# Patient Record
Sex: FEMALE | Race: BLACK OR AFRICAN AMERICAN | Employment: UNEMPLOYED | URBAN - METROPOLITAN AREA
[De-identification: names, ages, dates, MRNs, and addresses within clinical notes are randomized per-mention and may not be internally consistent; named-entity substitution may affect disease eponyms.]

---

## 2017-01-09 ENCOUNTER — OFFICE VISIT (OUTPATIENT)
Dept: FAMILY MEDICINE CLINIC | Age: 57
End: 2017-01-09

## 2017-01-09 VITALS
BODY MASS INDEX: 30.92 KG/M2 | TEMPERATURE: 98.2 F | SYSTOLIC BLOOD PRESSURE: 115 MMHG | HEIGHT: 65 IN | WEIGHT: 185.6 LBS | HEART RATE: 80 BPM | RESPIRATION RATE: 16 BRPM | OXYGEN SATURATION: 100 % | DIASTOLIC BLOOD PRESSURE: 76 MMHG

## 2017-01-09 DIAGNOSIS — J45.30 MILD PERSISTENT ASTHMA WITHOUT COMPLICATION: Primary | ICD-10-CM

## 2017-01-09 DIAGNOSIS — J11.1 INFLUENZA: ICD-10-CM

## 2017-01-09 DIAGNOSIS — Z90.710 S/P TAH (TOTAL ABDOMINAL HYSTERECTOMY): ICD-10-CM

## 2017-01-09 DIAGNOSIS — Z11.59 NEED FOR HEPATITIS C SCREENING TEST: ICD-10-CM

## 2017-01-09 DIAGNOSIS — I50.30 DIASTOLIC HEART FAILURE, NYHA CLASS 1 (HCC): ICD-10-CM

## 2017-01-09 DIAGNOSIS — I10 ESSENTIAL HYPERTENSION WITH GOAL BLOOD PRESSURE LESS THAN 140/90: ICD-10-CM

## 2017-01-09 DIAGNOSIS — J01.90 ACUTE BACTERIAL SINUSITIS: ICD-10-CM

## 2017-01-09 DIAGNOSIS — M54.2 NECK PAIN: ICD-10-CM

## 2017-01-09 DIAGNOSIS — B96.89 ACUTE BACTERIAL SINUSITIS: ICD-10-CM

## 2017-01-09 RX ORDER — TRETINOIN 0.25 MG/G
CREAM TOPICAL
COMMUNITY
Start: 2016-11-25

## 2017-01-09 RX ORDER — CLINDAMYCIN HYDROCHLORIDE 150 MG/1
CAPSULE ORAL
Refills: 0 | COMMUNITY
Start: 2016-11-27 | End: 2017-01-09 | Stop reason: ALTCHOICE

## 2017-01-09 RX ORDER — HYDROCODONE BITARTRATE AND ACETAMINOPHEN 5; 325 MG/1; MG/1
TABLET ORAL
Refills: 0 | COMMUNITY
Start: 2016-11-27 | End: 2022-06-07

## 2017-01-09 RX ORDER — DOXYCYCLINE 100 MG/1
CAPSULE ORAL
Refills: 2 | COMMUNITY
Start: 2017-01-03

## 2017-01-09 RX ORDER — MONTELUKAST SODIUM 10 MG/1
10 TABLET ORAL DAILY
Qty: 30 TAB | Refills: 1 | Status: SHIPPED | COMMUNITY
Start: 2017-01-09 | End: 2017-01-09 | Stop reason: SDUPTHER

## 2017-01-09 RX ORDER — ALPRAZOLAM 0.25 MG/1
0.25 TABLET ORAL
Qty: 60 TAB | Refills: 0 | Status: CANCELLED | OUTPATIENT
Start: 2017-01-09

## 2017-01-09 NOTE — PROGRESS NOTES
HISTORY OF PRESENT ILLNESS  Felicia Chu is a 64 y.o. female presents with Referral Follow Up; ED Follow-up; and Asthma    Agree with nurse note. Hypertensive pt presents to the office with a hx of Diastolic heart failure has a BP of 115/76. She saw NP Inge De Los Santos, associated with cardiologist, Dr. Jayson Anderson, on 9/9/16. HTN well controlled on Lisinopril 2.5 mg daily. Take Lasix 20 mg daily prn for edema. Pt's most recent eye exam was at [facility]. Pt takes Xanax 9.80 mg prn for certain situations like flying. She has used maybe 3 tablets out of 60 tablets, filled on 12/8/16 and rx'd by me. Pt reports going to [facility] prior to [facility] for R sided neck pain and spasm due to cough. Dx'd flu and sinusitis. Tx'd with antibiotic and Tamiflu. After 3 days of coughing, she began to experience chest tightness and continues with that today. She feels short-winded with talking. She is using Albuterol HFA 90 mcg more than twice weekly. She has not had an asthma flare-up since she was a child. Pt is agreeable with Hep C screening. Health Maintenance    Pt's most recent colonoscopy was on 1/9/12 with GI, Dr. Bronwyn Gottron. Pt is s/p ABRAHAM since 1999 due to vaginal bleeding and performed by Dr. Zackary Zambrano. Her insurance will cover a pap smear. Written by deandra Jimenez, as dictated by Dr. Kofi Rodriguez DO.    ROS    Review of Systems negative except as noted above in HPI.     ALLERGIES:    Allergies   Allergen Reactions    Effexor [Venlafaxine] Other (comments)     Did not feel right       CURRENT MEDICATIONS:    Outpatient Prescriptions Marked as Taking for the 1/9/17 encounter (Office Visit) with Roz Carlton DO   Medication Sig Dispense Refill    doxycycline (VIBRAMYCIN) 100 mg capsule TAKE ONE CAPSULE BY MOUTH EVERY DAY  2    HYDROcodone-acetaminophen (NORCO) 5-325 mg per tablet TAKE 1 TABLET EVERY 6 HOURS AS NEEDED FOR PAIN  0    tretinoin (RETIN-A) 0.025 % topical cream       montelukast (SINGULAIR) 10 mg tablet Take 1 Tab by mouth daily. Indications: ALLERGIC RHINITIS, MAINTENANCE THERAPY FOR ASTHMA 30 Tab 1    budesonide (PULMICORT) 180 mcg/actuation aepb inhaler Take 2 Puffs by inhalation two (2) times a day. Indications: MAINTENANCE THERAPY FOR ASTHMA 1 Each 5    ALPRAZolam (XANAX) 0.25 mg tablet Take 1 Tab by mouth two (2) times daily as needed for Anxiety. Max Daily Amount: 0.5 mg. 60 Tab 0    clindamycin (CLEOCIN T) 1 % lotion       albuterol (PROVENTIL HFA, VENTOLIN HFA, PROAIR HFA) 90 mcg/actuation inhaler Take 2 Puffs by inhalation every six (6) hours as needed for Wheezing. 1 Inhaler 3    fluticasone (FLONASE) 50 mcg/actuation nasal spray 2 Sprays by Both Nostrils route daily. 1 Bottle 5    furosemide (LASIX) 20 mg tablet Take once a day for swelling if needed. 90 Tab 3    ibuprofen (MOTRIN) 800 mg tablet Take 1 Tab by mouth every eight (8) hours as needed for Pain. 30 Tab 0    lisinopril (PRINIVIL, ZESTRIL) 2.5 mg tablet Take 1 Tab by mouth daily. 90 Tab 3    cyclobenzaprine (FLEXERIL) 10 mg tablet Take 1 Tab by mouth three (3) times daily as needed for Muscle Spasm(s). 30 Tab 0    cholecalciferol, vitamin D3, 4,000 unit tab Take  by mouth.  MULTIVITAMIN PO Take  by mouth. PAST MEDICAL HISTORY:    Past Medical History   Diagnosis Date    Acne vulgaris 03/2007     Dr. Hoda Hoyos Asthma childhood    Chalazion 10/17/00     Dr. Jorge Goel Chickenpox childhood    DDD (degenerative disc disease)      lumbar, cervical.  Dr. Iliana Núñez, cy[place]ney    Diastolic heart failure, NYHA class 1 ([facility] [address] 5/2015     EF 83-66%, grade 1 diastolic dysfunction, Mild LVH. Dr. Steffanie Ocasio 07/2015    Fever 07/2001     with arthralgia, rash. Dr. Raegan Gamboa pain, right 04/2012     Dr. Fabián Hester.  Ganglion of wrist 02/18/04     Left.   Dr. Kassie Clarke. pylori infection 1/3/12    Left ACL tear 2009, 2011     Left. due to ACL, MCL tear. Dr. Lam Ervin Left knee pain 10/05/09     Dr. Rosy Harman Left knee pain 2014     with tibial cyst.  Dr. Wilmar Siddiqui.  Leg swelling 05/2015     Dr. Marysol Banerjee.  Lipoma      Left shoulder.  LVH (left ventricular hypertrophy) 5/2015     Dr. Jerson Davis.  Screening for cervical cancer      Dr. Marco Antonio Salazar, Last Pap 5/8/15    Sigmoid diverticulosis 01/2012     Mild. Dr. Salina Orr.  Snoring 07/16/15     Dr. Erica Sprague. No ISAURO.  Sprain of finger, right 8/6/2013     Seen at [facility] in [facility] menopause        PAST SURGICAL HISTORY:    Past Surgical History   Procedure Laterality Date    Pr exc skin benig 0.6-1cm trunk,arm,leg Left 07/07/97     Lipoma. Left shoulder. Dr. Anna Barnes Hx total abdominal hysterectomy  1999     Dr. Brenton Marti. due to vaginal bleeding    Hx tubal ligation  1996    Hx cyst removal Left 02/2004, 2009     Left wrist.  Dr. Wen Pascual x2 then Dr. Wilmar Siddiqui.  Hx lipoma resection  07/1999     Dr. Anna Barnes Hx tonsillectomy  1979    Hx acl reconstruction Left 2009, 01/1011     due to Meniscal tear x 2 and ACL, MCL tear. Dr. Lam Ervin Hx open reduction internal fixation Right 4/27/2012     PINS PLACED IN SECOND METATARSAL FRACTURED TOES. Dr. Ivone Brice.  Hx orthopaedic Right 09/04/12     REMOVAL OF HARDWARE FROM SECOND METATARSAL. Dr. Lam Ervin Hx knee arthroscopy Left 12/2014     with debridement of tibial cyst.  Dr. Rosy Harman Hx colonoscopy  01/09/12     Dr. Salina Orr.  Hx endoscopy  01/09/12     due to abd pain. Dr. Meeta Iqbal.        FAMILY HISTORY:    Family History   Problem Relation Age of Onset    Hypertension Mother     No Known Problems Father     Stroke Paternal Grandfather        SOCIAL HISTORY:    Social History     Social History    Marital status:      Spouse name: N/A    Number of children: N/A    Years of education: N/A     Occupational History          Social History Main Topics    Smoking status: Former Smoker     Packs/day: 0.25     Years: 15.00     Types: Cigarettes     Quit date: 7/1/1999    Smokeless tobacco: Never Used      Comment: former cigarette smoker    Alcohol use 0.0 oz/week     0 Standard drinks or equivalent per week      Comment: Occ vodka/2 glasses wine per month    Drug use: No    Sexual activity: Yes     Partners: Male     Birth control/ protection: Surgical      Comment: ABRAHAM, NITHYA     Other Topics Concern    None     Social History Narrative       IMMUNIZATIONS:    Immunization History   Administered Date(s) Administered    Influenza Vaccine (Quad) PF 11/25/2016    TD Vaccine 07/01/2001         PHYSICAL EXAMINATION    Vital Signs    Visit Vitals    /76 (BP 1 Location: Left arm, BP Patient Position: Sitting)    Pulse 80    Temp 98.2 °F (36.8 °C) (Oral)    Resp 16    Ht 5' 4.57\" (1.64 m)    Wt 185 lb 9.6 oz (84.2 kg)    SpO2 100%    BMI 31.3 kg/m2       Weight Metrics 1/9/2017 11/25/2016 9/9/2016 3/29/2016 12/28/2015 12/14/2015 7/23/2015   Weight 185 lb 9.6 oz 180 lb 6.4 oz 184 lb 184 lb 14.4 oz 178 lb 181 lb 3.2 oz 182 lb   BMI 31.3 kg/m2 30.42 kg/m2 29.7 kg/m2 29.86 kg/m2 28.74 kg/m2 30.15 kg/m2 30.29 kg/m2       General appearance - Well nourished. Well appearing. Well developed. No acute distress. Overweight. Head - Normocephalic. Atraumatic. Eyes - pupils equal and reactive. Extraocular eye movements intact. Sclera anicteric. Mildly injected sclera. Ears - Hearing is grossly normal bilaterally. Nose - normal and patent. No polyps noted. No erythema. No discharge. Mouth - mucous membranes with adequate moisture. Posterior pharynx normal with cobblestone appearance. No erythema, white exudate or obstruction. Neck - supple. Midline trachea. No carotid bruits noted bilaterally. No thyromegaly noted.   Chest - clear to auscultation bilaterally anteriorly and posteriorly. No wheezes. No rales or rhonchi. Breath sounds are symmetrical bilaterally. Unlabored respirations. Occasional dry cough. Heart - normal rate. Regular rhythm. Normal S1, S2. No murmur noted. No rubs, clicks or gallops noted. Abdomen - soft and distended. No masses or organomegaly. No rebound, rigidity or guarding. Bowel sounds normal x 4 quadrants. No tenderness noted. Neurological - awake, alert and oriented to person, place, and time and event. Cranial nerves II through XII intact. Clear speech. Muscle strength is +5/5 x 4 extremities. Sensation is intact to light touch bilaterally. Steady gait. Heme/Lymph - peripheral pulses normal x 4 extremities. No peripheral edema is noted. Musculoskeletal - Intact x 4 extremities. Full ROM x 4 extremities. No pain with movement. Back exam - normal range of motion. No pain on palpation of the spinous processes in the cervical, thoracic, lumbar, sacral regions. No CVA tenderness. Skin - no rashes, erythema, ecchymosis, lacerations, abrasions, suspicious moles noted  Psychological -   normal behavior, dress and thought processes. Good insight. Good eye contact. Normal affect. Appropriate mood. Normal speech. ASSESSMENT and PLAN      ICD-10-CM ICD-9-CM    1. Mild persistent asthma without complication K75.86 411.09 budesonide (PULMICORT) 180 mcg/actuation aepb inhaler      DISCONTINUED: montelukast (SINGULAIR) 10 mg tablet   2. Essential hypertension with goal blood pressure less than 140/90 I10 401.9    3. Need for hepatitis C screening test Z11.59 V73.89 HEPATITIS C AB   4. Acute bacterial sinusitis J01.90 461.9 doxycycline (VIBRAMYCIN) 100 mg capsule    B96.89      resolved after antibiotic   5. S/P ABRAHAM (total abdominal hysterectomy) Z90.710 V88.01    6. Neck pain M54.2 723.1 HYDROcodone-acetaminophen (NORCO) 5-325 mg per tablet    R due to spasm from cough   7. Influenza J11.1 487.1    8. Diastolic heart failure, NYHA class 1 ([facility]) I50.30 428.30     stable       Discussed the patient's BMI with her. The BMI follow up plan is as follows: BMI is out of normal parameters and plan is as follows: I have counseled this patient on diet and exercise regimens. Chart reviewed and updated. [facility] reviewed. Xanax 0.25 mg last filled on 12/8/16 for 60 tablets. New Controlled Substance Agreement reviewed and signed. Advise pt to bring in pill bottle(s) for pill counts. Pt declines nebulizer tx today due to rushing to work on [employer]. Continue current medications and care. Start Singulair 10 mg daily and Pulmicort inhaler 2 puffs BID, rinse mouth after use. Prescriptions written and sent to pharmacy; medication side effects discussed. Singulair 10 mg. Pulmicort 180 mcg/actuation aepb inhaler. Most recent tests reviewed. Recheck pertinent labs today. Recent office visit notes from [address] reviewed. Get recent office visit notes from [facility] and [facility]. Advised pt to sign release. Referrals given; patient urged to keep appointments with specialists. Counseled patient on health concerns:  BP, chest tightness, and asthma plan. Relevant handouts given and discussed with patient. Immunizations noted. Offered empathy, support, legitimation, prayers, partnership to patient. Praised patient for progress. Pt will bring in her [address] Follow-up Disposition:  Return in about 1 month (around 2/9/2017) for asthma. Patient was offered a choice/choices in the treatment plan today. Patient expresses understanding of the plan and agrees with recommendations. Written by deandra Reyna, as dictated by Dr. Carolyn Smith DO. Documentation True and Accepted by Katalina Perea. Davide Silveira.       Patient Instructions       Asthma Action Plan: After Your Visit  Your Care Instructions  An asthma action plan is based on peak flow and asthma symptoms. Sorting symptoms and peak flow into red, yellow, and green \"zones\" can help you know how bad your asthma is and what actions you should take. Work with your doctor to make your plan. An action plan may include:  · The peak flow readings and symptoms for each zone. · What medicines to take in each zone. · When to call a doctor. · A list of emergency contact numbers. · A list of your asthma triggers. Follow-up care is a key part of your treatment and safety. Be sure to make and go to all appointments, and call your doctor if you are having problems. It's also a good idea to know your test results and keep a list of the medicines you take. How can you care for yourself at home? · Take your daily medicines to help minimize long-term damage and avoid asthma attacks. · Check your peak flow every morning and evening. This is the best way to know how well your lungs are working. · Check your action plan to see what zone you are in.  ¨ If you are in the green zone, keep taking your daily asthma medicines as prescribed. ¨ If you are in the yellow zone, you may be having or will soon have an asthma attack. You may not have any symptoms, but your lungs are not working as well as they should. Take the medicines listed in your action plan. If you stay in the yellow zone, your doctor may need to increase the dose or add a medicine. ¨ If you are in the red zone, follow your action plan. If your symptoms or peak flow don't improve soon, you may need to go to the emergency room or be admitted to the hospital.  · Use an asthma diary. Write down your peak flow readings in the asthma diary. If you have an attack, write down what caused it (if you know), the symptoms, and what medicine you took.   · Make sure you know how and when to call your doctor or go to the hospital.  · Take both the asthma action plan and the asthma diaryalong with your peak flow meter and medicineswhen you see your doctor. Tell your doctor if you are having trouble following your action plan. When should you call for help? Call 911 anytime you think you may need emergency care. For example, call if:  · You have severe trouble breathing. Call your doctor now or seek immediate medical care if:  · Your symptoms do not get better after you have followed your asthma action plan. · You cough up yellow, dark brown, or bloody mucus (sputum). Watch closely for changes in your health, and be sure to contact your doctor if:  · Your coughing and wheezing get worse. · You need to use quick-relief medicine on more than 2 days a week (unless it is just for exercise). · You need help figuring out what is triggering your asthma attacks. Where can you learn more? Go to [employer]al[employer]be  Enter B511 in the search box to learn more about \"Asthma Action Plan: After Your Visit. \"   © 5460-3938 Healthwise, [employer]. Care instructions adapted under license by Alexis Brown (which disclaims liability or warranty for this information). This care instruction is for use with your licensed healthcare professional. If you have questions about a medical condition or this instruction, always ask your healthcare professional. [place] [address] any warranty or liability for your use of this information. Content Version: 39.1.569388; Last Revised: March 9, 2012        Asthma Plan:  Use rescue inhaler (i.e. Albuterol, Xopenex, Maxair, Proventil, or Ventolin) every 4-6 hours as needed for breathing, chest tightness, tight cough or wheezing. If you are needing the rescue inhaler more than twice weekly then add a steroid inhaler (i.e. Asmanex, Flovent, or Pulmicort.)  Remember to rinse your mouth after using the steroid inhaler to prevent oral thrush. If you still do not have relief of symptoms, change the steroid inhaler to a combination inhaler (i.e. Advair or Symbicort).   Only use these inhalers a maximum of every 12 hours. Call your doctor for a prescription and further recommendations. Advised protocol for clearing congestion:  Increase fluid intake, especially water to thin mucous and boost the immune system. Avoid sugar and dairy while congested since they thicken mucous. Get plenty of rest!  Gargle 3 times daily and as needed in Listerine or warm salt water vinegar solutions (1 tsp salt, 1 tsp vinegar in 1 cup lukewarm water.)  Use OTC nasal saline spray up each nostril twice daily. Use humidifier at bedtime. Use OTC Mucinex 600 mg twice daily to loosen mucous. Use OTC Tylenol Arthritis or Ibuprofen up to 800 mg up to 3 times daily as needed for pain, fever or headaches. Avoid decongestants and Ibuprofen if you have high blood pressure! If mucous is consistently discolored yellow or green throughout the day for more than a week, call the doctor for an evaluation. Asthma Action Plan: After Your Visit  Your Care Instructions  An asthma action plan is based on peak flow and asthma symptoms. Sorting symptoms and peak flow into red, yellow, and green \"zones\" can help you know how bad your asthma is and what actions you should take. Work with your doctor to make your plan. An action plan may include:  · The peak flow readings and symptoms for each zone. · What medicines to take in each zone. · When to call a doctor. · A list of emergency contact numbers. · A list of your asthma triggers. Follow-up care is a key part of your treatment and safety. Be sure to make and go to all appointments, and call your doctor if you are having problems. It's also a good idea to know your test results and keep a list of the medicines you take. How can you care for yourself at home? · Take your daily medicines to help minimize long-term damage and avoid asthma attacks. · Check your peak flow every morning and evening. This is the best way to know how well your lungs are working.   · Check your action plan to see what zone you are in.  ¨ If you are in the green zone, keep taking your daily asthma medicines as prescribed. ¨ If you are in the yellow zone, you may be having or will soon have an asthma attack. You may not have any symptoms, but your lungs are not working as well as they should. Take the medicines listed in your action plan. If you stay in the yellow zone, your doctor may need to increase the dose or add a medicine. ¨ If you are in the red zone, follow your action plan. If your symptoms or peak flow don't improve soon, you may need to go to the emergency room or be admitted to the hospital.  · Use an asthma diary. Write down your peak flow readings in the asthma diary. If you have an attack, write down what caused it (if you know), the symptoms, and what medicine you took. · Make sure you know how and when to call your doctor or go to the hospital.  · Take both the asthma action plan and the asthma diaryalong with your peak flow meter and medicineswhen you see your doctor. Tell your doctor if you are having trouble following your action plan. When should you call for help? Call 911 anytime you think you may need emergency care. For example, call if:  · You have severe trouble breathing. Call your doctor now or seek immediate medical care if:  · Your symptoms do not get better after you have followed your asthma action plan. · You cough up yellow, dark brown, or bloody mucus (sputum). Watch closely for changes in your health, and be sure to contact your doctor if:  · Your coughing and wheezing get worse. · You need to use quick-relief medicine on more than 2 days a week (unless it is just for exercise). · You need help figuring out what is triggering your asthma attacks. Where can you learn more? Go to [employer]be  Enter B511 in the search box to learn more about \"Asthma Action Plan: After Your Visit. \"   © 9216-6882 Healthwise, [employer].  Care instructions adapted under license by Teressa Rush (which disclaims liability or warranty for this information). This care instruction is for use with your licensed healthcare professional. If you have questions about a medical condition or this instruction, always ask your healthcare professional. [address] any warranty or liability for your use of this information. Content Version: 46.8.582098;  Last Revised: March 9, 2012

## 2017-01-09 NOTE — ACP (ADVANCE CARE PLANNING)
Discussed ACP with patient. Gave pt an Right to [facility]. Pt has a copy at home. Declines referral to [facility] team at this time. Patient will bring document to her next office visit or attach it to her MyChart record.

## 2017-01-09 NOTE — MR AVS SNAPSHOT
Visit Information Date & Time Provider Department Dept. Phone Encounter #  
 1/9/2017  4:15 PM Olimpia Calix DO [facility] [phone] [id number] Your Appointments 11/14/2017  8:40 AM  
ESTABLISHED PATIENT with Vivien Hylton MD  
[facility] ([facility]) Appt Note: one year follow up  
 [address] [facility] [address]  
[address] [facility] [address] Upcoming Health Maintenance Date Due Hepatitis C Screening 5/26/2017* BREAST CANCER SCRN MAMMOGRAM 4/19/2018 PAP AKA CERVICAL CYTOLOGY 5/8/2018 COLONOSCOPY 1/9/2022 DTaP/Tdap/Td series (2 - Td) 1/9/2027 *Topic was postponed. The date shown is not the original due date. Allergies as of 1/9/2017  Review Complete On: 1/9/2017 By: Lauren Luis LPN Severity Noted Reaction Type Reactions Effexor [Venlafaxine]  02/22/2013    Other (comments) Did not feel right Current Immunizations  Reviewed on 1/9/2017 Name Date Influenza Vaccine (Quad) PF 11/25/2016 TD Vaccine 7/1/2001 Reviewed by Olimpia Calix DO on 1/9/2017 at  5:19 PM  
You Were Diagnosed With   
  
 Codes Comments Mild persistent asthma without complication    -  Primary ICD-10-CM: J45.30 ICD-9-CM: 493.90 Essential hypertension with goal blood pressure less than 140/90     ICD-10-CM: I10 
ICD-9-CM: 401.9 Need for hepatitis C screening test     ICD-10-CM: Z11.59 
ICD-9-CM: V73.89 Acute bacterial sinusitis     ICD-10-CM: J01.90, B96.89 
ICD-9-CM: 461.9 resolved after antibiotic S/P ABRAHAM (total abdominal hysterectomy)     ICD-10-CM: Z90.710 ICD-9-CM: V88.01 Neck pain     ICD-10-CM: M54.2 ICD-9-CM: 723.1 R due to spasm from cough Influenza     ICD-10-CM: J11.1 ICD-9-CM: 487. 1 Vitals BP Pulse Temp Resp Height(growth percentile) Weight(growth percentile) 115/76 (BP 1 Location: Left arm, BP Patient Position: Sitting) 80 98.2 °F (36.8 °C) (Oral) 16 5' 4.57\" (1.64 m) 185 lb 9.6 oz (84.2 kg) SpO2 BMI OB Status Smoking Status 100% 31.3 kg/m2 Hysterectomy Former Smoker BMI and BSA Data Body Mass Index Body Surface Area  
 31.3 kg/m 2 1.96 m 2 Preferred Pharmacy Pharmacy Name Phone [facility] #[id number]- [place], [address] [place] [phone] Your Updated Medication List  
  
   
This list is accurate as of: 1/9/17  5:26 PM.  Always use your most recent med list.  
  
  
  
  
 albuterol 90 mcg/actuation inhaler Commonly known as:  PROVENTIL HFA, VENTOLIN HFA, PROAIR HFA Take 2 Puffs by inhalation every six (6) hours as needed for Wheezing. ALPRAZolam 0.25 mg tablet Commonly known as:  Nedra [place] Take 1 Tab by mouth two (2) times daily as needed for Anxiety. Max Daily Amount: 0.5 mg.  
  
 budesonide 180 mcg/actuation Aepb inhaler Commonly known as:  PULMICORT Take 2 Puffs by inhalation two (2) times a day. Indications: MAINTENANCE THERAPY FOR ASTHMA  
  
 cholecalciferol (vitamin D3) 4,000 unit Tab Take  by mouth. clindamycin 1 % lotion Commonly known as:  CLEOCIN T  
  
 cyclobenzaprine 10 mg tablet Commonly known as:  FLEXERIL Take 1 Tab by mouth three (3) times daily as needed for Muscle Spasm(s). doxycycline 100 mg capsule Commonly known as:  VIBRAMYCIN  
TAKE ONE CAPSULE BY MOUTH EVERY DAY  
  
 fluticasone 50 mcg/actuation nasal spray Commonly known as:  Ankur [place] 2 Sprays by Both Nostrils route daily. furosemide 20 mg tablet Commonly known as:  LASIX Take once a day for swelling if needed. HYDROcodone-acetaminophen 5-325 mg per tablet Commonly known as:  NORCO  
TAKE 1 TABLET EVERY 6 HOURS AS NEEDED FOR PAIN  
  
 ibuprofen 800 mg tablet Commonly known as:  MOTRIN Take 1 Tab by mouth every eight (8) hours as needed for Pain. lisinopril 2.5 mg tablet Commonly known as:  Dave Neda Take 1 Tab by mouth daily. montelukast 10 mg tablet Commonly known as:  SINGULAIR Take 1 Tab by mouth daily. Indications: ALLERGIC RHINITIS, MAINTENANCE THERAPY FOR ASTHMA MULTIVITAMIN PO Take  by mouth. tretinoin 0.025 % topical cream  
Commonly known as:  RETIN-A Prescriptions Sent to Pharmacy Refills  
 budesonide (PULMICORT) 180 mcg/actuation aepb inhaler 5 Sig: Take 2 Puffs by inhalation two (2) times a day. Indications: MAINTENANCE THERAPY FOR ASTHMA Class: Normal  
 Pharmacy: [facility]/pharmacy [address] [place] [address] #: [phone] Route: Inhalation We Performed the Following HEPATITIS C AB [81398 CPT(R)] Patient Instructions Asthma Action Plan: After Your Visit Your Care Instructions An asthma action plan is based on peak flow and asthma symptoms. Sorting symptoms and peak flow into red, yellow, and green \"zones\" can help you know how bad your asthma is and what actions you should take. Work with your doctor to make your plan. An action plan may include: · The peak flow readings and symptoms for each zone. · What medicines to take in each zone. · When to call a doctor. · A list of emergency contact numbers. · A list of your asthma triggers. Follow-up care is a key part of your treatment and safety. Be sure to make and go to all appointments, and call your doctor if you are having problems. It's also a good idea to know your test results and keep a list of the medicines you take. How can you care for yourself at home? · Take your daily medicines to help minimize long-term damage and avoid asthma attacks. · Check your peak flow every morning and evening. This is the best way to know how well your lungs are working. · Check your action plan to see what zone you are in. ¨ If you are in the green zone, keep taking your daily asthma medicines as prescribed. ¨ If you are in the yellow zone, you may be having or will soon have an asthma attack. You may not have any symptoms, but your lungs are not working as well as they should. Take the medicines listed in your action plan. If you stay in the yellow zone, your doctor may need to increase the dose or add a medicine. ¨ If you are in the red zone, follow your action plan. If your symptoms or peak flow don't improve soon, you may need to go to the emergency room or be admitted to the hospital. 
· Use an asthma diary. Write down your peak flow readings in the asthma diary. If you have an attack, write down what caused it (if you know), the symptoms, and what medicine you took. · Make sure you know how and when to call your doctor or go to the hospital. 
· Take both the asthma action plan and the asthma diaryalong with your peak flow meter and medicineswhen you see your doctor. Tell your doctor if you are having trouble following your action plan. When should you call for help? Call 911 anytime you think you may need emergency care. For example, call if: 
· You have severe trouble breathing. Call your doctor now or seek immediate medical care if: 
· Your symptoms do not get better after you have followed your asthma action plan. · You cough up yellow, dark brown, or bloody mucus (sputum). Watch closely for changes in your health, and be sure to contact your doctor if: 
· Your coughing and wheezing get worse. · You need to use quick-relief medicine on more than 2 days a week (unless it is just for exercise). · You need help figuring out what is triggering your asthma attacks. Where can you learn more? Go to Viewbix.be Enter 081 2603 in the search box to learn more about \"Asthma Action Plan: After Your Visit. \"  
© 4987-2974 Healthwise, Incorporated.  Care instructions adapted under license by Scott Sharma (which disclaims liability or warranty for this information). This care instruction is for use with your licensed healthcare professional. If you have questions about a medical condition or this instruction, always ask your healthcare professional. [address] any warranty or liability for your use of this information. Content Version: 55.9.331996; Last Revised: March 9, 2012 Asthma Plan:  Use rescue inhaler (i.e. Albuterol, Xopenex, Maxair, Proventil, or Ventolin) every 4-6 hours as needed for breathing, chest tightness, tight cough or wheezing. If you are needing the rescue inhaler more than twice weekly then add a steroid inhaler (i.e. Asmanex, Flovent, or Pulmicort.)  Remember to rinse your mouth after using the steroid inhaler to prevent oral thrush. If you still do not have relief of symptoms, change the steroid inhaler to a combination inhaler (i.e. Advair or Symbicort). Only use these inhalers a maximum of every 12 hours. Call your doctor for a prescription and further recommendations. Advised protocol for clearing congestion:  Increase fluid intake, especially water to thin mucous and boost the immune system. Avoid sugar and dairy while congested since they thicken mucous. Get plenty of rest!  Gargle 3 times daily and as needed in Listerine or warm salt water vinegar solutions (1 tsp salt, 1 tsp vinegar in 1 cup lukewarm water.)  Use OTC nasal saline spray up each nostril twice daily. Use humidifier at bedtime. Use OTC Mucinex 600 mg twice daily to loosen mucous. Use OTC Tylenol Arthritis or Ibuprofen up to 800 mg up to 3 times daily as needed for pain, fever or headaches. Avoid decongestants and Ibuprofen if you have high blood pressure! If mucous is consistently discolored yellow or green throughout the day for more than a week, call the doctor for an evaluation. Asthma Action Plan: After Your Visit Your Care Instructions An asthma action plan is based on peak flow and asthma symptoms. Sorting symptoms and peak flow into red, yellow, and green \"zones\" can help you know how bad your asthma is and what actions you should take. Work with your doctor to make your plan. An action plan may include: · The peak flow readings and symptoms for each zone. · What medicines to take in each zone. · When to call a doctor. · A list of emergency contact numbers. · A list of your asthma triggers. Follow-up care is a key part of your treatment and safety. Be sure to make and go to all appointments, and call your doctor if you are having problems. It's also a good idea to know your test results and keep a list of the medicines you take. How can you care for yourself at home? · Take your daily medicines to help minimize long-term damage and avoid asthma attacks. · Check your peak flow every morning and evening. This is the best way to know how well your lungs are working. · Check your action plan to see what zone you are in. 
¨ If you are in the green zone, keep taking your daily asthma medicines as prescribed. ¨ If you are in the yellow zone, you may be having or will soon have an asthma attack. You may not have any symptoms, but your lungs are not working as well as they should. Take the medicines listed in your action plan. If you stay in the yellow zone, your doctor may need to increase the dose or add a medicine. ¨ If you are in the red zone, follow your action plan. If your symptoms or peak flow don't improve soon, you may need to go to the emergency room or be admitted to the hospital. 
· Use an asthma diary. Write down your peak flow readings in the asthma diary. If you have an attack, write down what caused it (if you know), the symptoms, and what medicine you took.  
· Make sure you know how and when to call your doctor or go to the hospital. 
 · Take both the asthma action plan and the asthma diaryalong with your peak flow meter and medicineswhen you see your doctor. Tell your doctor if you are having trouble following your action plan. When should you call for help? Call 911 anytime you think you may need emergency care. For example, call if: 
· You have severe trouble breathing. Call your doctor now or seek immediate medical care if: 
· Your symptoms do not get better after you have followed your asthma action plan. · You cough up yellow, dark brown, or bloody mucus (sputum). Watch closely for changes in your health, and be sure to contact your doctor if: 
· Your coughing and wheezing get worse. · You need to use quick-relief medicine on more than 2 days a week (unless it is just for exercise). · You need help figuring out what is triggering your asthma attacks. Where can you learn more? Go to Gutenberg Technology.be Enter [phone] in the search box to learn more about \"Asthma Action Plan: After Your Visit. \"  
© 2010-5960 Healthwise, Incorporated. Care instructions adapted under license by Sammi Bermeo (which disclaims liability or warranty for this information). This care instruction is for use with your licensed healthcare professional. If you have questions about a medical condition or this instruction, always ask your healthcare professional. [address] any warranty or liability for your use of this information. Content Version: 29.1.715775; Last Revised: March 9, 2012 Introducing [facility]! Sammi Bermeo introduces ozuke patient portal. Now you can access parts of your medical record, email your doctor's office, and request medication refills online. 1. In your internet browser, go to [email or web address] 2. Click on the First Time User? Click Here link in the Sign In box. You will see the New Member Sign Up page. 3. Enter your KitCheck Access Code exactly as it appears below. You will not need to use this code after youve completed the sign-up process. If you do not sign up before the expiration date, you must request a new code. · KitCheck Access Code: [email or web address] Expires: 4/9/2017  5:26 PM 
 
4. Enter the last four digits of your Social Security Number (xxxx) and Date of Birth (mm/dd/yyyy) as indicated and click Submit. You will be taken to the next sign-up page. 5. Create a KitCheck ID. This will be your KitCheck login ID and cannot be changed, so think of one that is secure and easy to remember. 6. Create a KitCheck password. You can change your password at any time. 7. Enter your Password Reset Question and Answer. This can be used at a later time if you forget your password. 8. Enter your e-mail address. You will receive e-mail notification when new information is available in [address]. 9. Click Sign Up. You can now view and download portions of your medical record. 10. Click the Download Summary menu link to download a portable copy of your medical information. If you have questions, please visit the Frequently Asked Questions section of the KitCheck website. Remember, KitCheck is NOT to be used for urgent needs. For medical emergencies, dial 911. Now available from your iPhone and Android! Please provide this summary of care documentation to your next provider. Your primary care clinician is listed as Abby Bueno. If you have any questions after today's visit, please call [phone].

## 2017-01-09 NOTE — PROGRESS NOTES
Chief Complaint   Patient presents with    Medication Refill     Xanax     1. Have you been to the ER, urgent care clinic since your last visit? Hospitalized since your last visit? Yes, Flu December 2016, [facility] [address] location  2. Have you seen or consulted any other health care providers outside of the [address] [place] [address] since your last visit? Include any pap smears or colon screening. Yes, [facility], [address] location.

## 2017-01-09 NOTE — PATIENT INSTRUCTIONS
Asthma Action Plan: After Your Visit  Your Care Instructions  An asthma action plan is based on peak flow and asthma symptoms. Sorting symptoms and peak flow into red, yellow, and green \"zones\" can help you know how bad your asthma is and what actions you should take. Work with your doctor to make your plan. An action plan may include:  · The peak flow readings and symptoms for each zone. · What medicines to take in each zone. · When to call a doctor. · A list of emergency contact numbers. · A list of your asthma triggers. Follow-up care is a key part of your treatment and safety. Be sure to make and go to all appointments, and call your doctor if you are having problems. It's also a good idea to know your test results and keep a list of the medicines you take. How can you care for yourself at home? · Take your daily medicines to help minimize long-term damage and avoid asthma attacks. · Check your peak flow every morning and evening. This is the best way to know how well your lungs are working. · Check your action plan to see what zone you are in.  ¨ If you are in the green zone, keep taking your daily asthma medicines as prescribed. ¨ If you are in the yellow zone, you may be having or will soon have an asthma attack. You may not have any symptoms, but your lungs are not working as well as they should. Take the medicines listed in your action plan. If you stay in the yellow zone, your doctor may need to increase the dose or add a medicine. ¨ If you are in the red zone, follow your action plan. If your symptoms or peak flow don't improve soon, you may need to go to the emergency room or be admitted to the hospital.  · Use an asthma diary. Write down your peak flow readings in the asthma diary. If you have an attack, write down what caused it (if you know), the symptoms, and what medicine you took.   · Make sure you know how and when to call your doctor or go to the hospital.  · Take both the asthma action plan and the asthma diaryalong with your peak flow meter and medicineswhen you see your doctor. Tell your doctor if you are having trouble following your action plan. When should you call for help? Call 911 anytime you think you may need emergency care. For example, call if:  · You have severe trouble breathing. Call your doctor now or seek immediate medical care if:  · Your symptoms do not get better after you have followed your asthma action plan. · You cough up yellow, dark brown, or bloody mucus (sputum). Watch closely for changes in your health, and be sure to contact your doctor if:  · Your coughing and wheezing get worse. · You need to use quick-relief medicine on more than 2 days a week (unless it is just for exercise). · You need help figuring out what is triggering your asthma attacks. Where can you learn more? Go to SETVI.be  Enter B511 in the search box to learn more about \"Asthma Action Plan: After Your Visit. \"   © 6090-4852 Healthwise, Incorporated. Care instructions adapted under license by Oskar Willis (which disclaims liability or warranty for this information). This care instruction is for use with your licensed healthcare professional. If you have questions about a medical condition or this instruction, always ask your healthcare professional. [address] any warranty or liability for your use of this information. Content Version: 35.9.850310; Last Revised: March 9, 2012        Asthma Plan:  Use rescue inhaler (i.e. Albuterol, Xopenex, Maxair, Proventil, or Ventolin) every 4-6 hours as needed for breathing, chest tightness, tight cough or wheezing. If you are needing the rescue inhaler more than twice weekly then add a steroid inhaler (i.e. Asmanex, Flovent, or Pulmicort.)  Remember to rinse your mouth after using the steroid inhaler to prevent oral thrush.   If you still do not have relief of symptoms, change the steroid inhaler to a combination inhaler (i.e. Advair or Symbicort). Only use these inhalers a maximum of every 12 hours. Call your doctor for a prescription and further recommendations. Advised protocol for clearing congestion:  Increase fluid intake, especially water to thin mucous and boost the immune system. Avoid sugar and dairy while congested since they thicken mucous. Get plenty of rest!  Gargle 3 times daily and as needed in Listerine or warm salt water vinegar solutions (1 tsp salt, 1 tsp vinegar in 1 cup lukewarm water.)  Use OTC nasal saline spray up each nostril twice daily. Use humidifier at bedtime. Use OTC Mucinex 600 mg twice daily to loosen mucous. Use OTC Tylenol Arthritis or Ibuprofen up to 800 mg up to 3 times daily as needed for pain, fever or headaches. Avoid decongestants and Ibuprofen if you have high blood pressure! If mucous is consistently discolored yellow or green throughout the day for more than a week, call the doctor for an evaluation. Asthma Action Plan: After Your Visit  Your Care Instructions  An asthma action plan is based on peak flow and asthma symptoms. Sorting symptoms and peak flow into red, yellow, and green \"zones\" can help you know how bad your asthma is and what actions you should take. Work with your doctor to make your plan. An action plan may include:  · The peak flow readings and symptoms for each zone. · What medicines to take in each zone. · When to call a doctor. · A list of emergency contact numbers. · A list of your asthma triggers. Follow-up care is a key part of your treatment and safety. Be sure to make and go to all appointments, and call your doctor if you are having problems. It's also a good idea to know your test results and keep a list of the medicines you take. How can you care for yourself at home? · Take your daily medicines to help minimize long-term damage and avoid asthma attacks.   · Check your peak flow every morning and evening. This is the best way to know how well your lungs are working. · Check your action plan to see what zone you are in.  ¨ If you are in the green zone, keep taking your daily asthma medicines as prescribed. ¨ If you are in the yellow zone, you may be having or will soon have an asthma attack. You may not have any symptoms, but your lungs are not working as well as they should. Take the medicines listed in your action plan. If you stay in the yellow zone, your doctor may need to increase the dose or add a medicine. ¨ If you are in the red zone, follow your action plan. If your symptoms or peak flow don't improve soon, you may need to go to the emergency room or be admitted to the hospital.  · Use an asthma diary. Write down your peak flow readings in the asthma diary. If you have an attack, write down what caused it (if you know), the symptoms, and what medicine you took. · Make sure you know how and when to call your doctor or go to the hospital.  · Take both the asthma action plan and the asthma diaryalong with your peak flow meter and medicineswhen you see your doctor. Tell your doctor if you are having trouble following your action plan. When should you call for help? Call 911 anytime you think you may need emergency care. For example, call if:  · You have severe trouble breathing. Call your doctor now or seek immediate medical care if:  · Your symptoms do not get better after you have followed your asthma action plan. · You cough up yellow, dark brown, or bloody mucus (sputum). Watch closely for changes in your health, and be sure to contact your doctor if:  · Your coughing and wheezing get worse. · You need to use quick-relief medicine on more than 2 days a week (unless it is just for exercise). · You need help figuring out what is triggering your asthma attacks. Where can you learn more?    Go to Jimmy Fairly.be  Enter B511 in the search box to learn more about \"Asthma Action Plan: After Your Visit. \"   © 6897-4683 Healthwise, Incorporated. Care instructions adapted under license by [employer] (which disclaims liability or warranty for this information). This care instruction is for use with your licensed healthcare professional. If you have questions about a medical condition or this instruction, always ask your healthcare professional. [address] any warranty or liability for your use of this information. Content Version: 41.6.509714;  Last Revised: March 9, 2012

## 2017-12-28 RX ORDER — LISINOPRIL 2.5 MG/1
2.5 TABLET ORAL DAILY
Qty: 30 TAB | Refills: 0 | Status: SHIPPED | OUTPATIENT
Start: 2017-12-28 | End: 2018-01-29 | Stop reason: SDUPTHER

## 2017-12-28 NOTE — TELEPHONE ENCOUNTER
Last Refill: 11/25/16    Last Office Visit: 1/9/17    No upcoming appointment     Last Labs:11/25/16

## 2018-08-21 ENCOUNTER — TELEPHONE (OUTPATIENT)
Dept: FAMILY MEDICINE CLINIC | Age: 58
End: 2018-08-21

## 2018-08-21 NOTE — TELEPHONE ENCOUNTER
----- Message from Gaurang Rinaldi sent at 8/21/2018  2:21 PM EDT -----  Regarding: Dr. Omer Nation is requesting for medical records to be sent to Dr. Latoya Calero R)[phone]; Attention: Jerri(nurse). Stated she requested these numerous of times for 1 yr with no return call about it. Pt best contact [phone].

## 2018-08-21 NOTE — TELEPHONE ENCOUNTER
Spoke with patient, patient gave 2 identifiers. Informed patient that we have not received a release of medical records form faxed to the office from [facility]. Patient stated that it has been faxed twice and filled out once in the office. Informed patient that I do not see anything in her chart. Patient stated that she will fax it again, gave patient the front fax number and asked to put \"ATTN \". Informed patient that if she is requesting the whole record then we send the request off to [employer] and it takes at least two weeks once the request is received. Patient verbally understood. Apologized for any inconvenience.

## 2021-03-02 ENCOUNTER — TRANSCRIBE ORDER (OUTPATIENT)
Dept: SCHEDULING | Age: 61
End: 2021-03-02

## 2021-03-02 DIAGNOSIS — M54.12 CERVICAL RADICULITIS: Primary | ICD-10-CM

## 2021-03-10 ENCOUNTER — HOSPITAL ENCOUNTER (OUTPATIENT)
Dept: MRI IMAGING | Age: 61
Discharge: HOME | End: 2021-03-10
Attending: PHYSICAL MEDICINE & REHABILITATION
Payer: COMMERCIAL

## 2021-03-10 DIAGNOSIS — M54.12 CERVICAL RADICULITIS: ICD-10-CM

## 2021-03-10 PROCEDURE — 72141 MRI NECK SPINE W/O DYE: CPT

## 2022-06-07 ENCOUNTER — OFFICE VISIT (OUTPATIENT)
Dept: URGENT CARE | Age: 62
End: 2022-06-07
Payer: COMMERCIAL

## 2022-06-07 VITALS
WEIGHT: 155.5 LBS | RESPIRATION RATE: 16 BRPM | HEART RATE: 68 BPM | OXYGEN SATURATION: 99 % | BODY MASS INDEX: 26.22 KG/M2 | TEMPERATURE: 98.5 F | SYSTOLIC BLOOD PRESSURE: 125 MMHG | DIASTOLIC BLOOD PRESSURE: 70 MMHG

## 2022-06-07 DIAGNOSIS — Z20.1 EXPOSURE TO TB: Primary | ICD-10-CM

## 2022-06-07 PROCEDURE — 99212 OFFICE O/P EST SF 10 MIN: CPT | Performed by: FAMILY MEDICINE

## 2022-06-07 NOTE — LETTER
June 7, 2022  [address] [place] [id number]    Dear Mauricio Ruiz: Thank you for requesting access to Everstring. Please follow the instructions below to securely access and download your online medical record. Everstring allows you to send messages to your doctor, view your test results, renew your prescriptions, schedule appointments, and more. How Do I Sign Up? 1. In your internet browser, go to [email or web address]. 2. Click on the First Time User? Click Here link in the Sign In box. You will see the New Member Sign Up page. 3. Enter your Everstring Access Code exactly as it appears below. You will not need to use this code after youve completed the sign-up process. If you do not sign up before the expiration date, you must request a new code. Everstring Access Code: [email or web address]  Expires: 7/22/2022  8:02 AM     4. Enter the last four digits of your Social Security Number (xxxx) and Date of Birth (mm/dd/yyyy) as indicated and click Submit. You will be taken to the next sign-up page. 5. Create a Everstring ID. This will be your Everstring login ID and cannot be changed, so think of one that is secure and easy to remember. 6. Create a Everstring password. You can change your password at any time. 7. Enter your Password Reset Question and Answer. This can be used at a later time if you forget your password. 8. Enter your e-mail address. You will receive e-mail notification when new information is available in [address]. 9. Click Sign Up. You can now view and download portions of your medical record. 10. Click the Download Summary menu link to download a portable copy of your medical information. Additional Information    If you have questions, please visit the Frequently Asked Questions section of the Everstring website at [email or web address] Remember, Everstring is NOT to be used for urgent needs. For medical emergencies, dial 911.     Now available from your iPhone and Android!     Sincerely,   [employer]

## 2022-06-07 NOTE — PROGRESS NOTES
Suzanna Cavanaugh is a 64 y.o. female who presents for chest x-ray. Has hx of exposure to TB and positive PPDs in the past. Denies cough, fever, chills, abnormal wt loss. The history is provided by the patient. Past Medical History:   Diagnosis Date    Acne vulgaris 03/2007    Dr. Merle Zapata Asthma childhood    Chalazion 10/17/00    Dr. Jenifer Moffett Chickenpox childhood    DDD (degenerative disc disease)     lumbar, cervical.  Dr. Portillo Hanks, [facility]    Diastolic heart failure, NYHA class 1 ([address] 5/2015    EF 19-41%, grade 1 diastolic dysfunction, Mild LVH. Dr. Bhargav Gerardo 07/2015    Fever 07/2001    with arthralgia, rash. Dr. Subramanian Goring pain, right 04/2012    Dr. Les Driscoll.  Ganglion of wrist 02/18/04    Left. Dr. Kelly Everett. pylori infection 1/3/12    Left ACL tear 2009, 2011    Left. due to ACL, MCL tear. Dr. Jose Newton Left knee pain 10/05/09    Dr. Geovanny Moralez Left knee pain 2014    with tibial cyst.  Dr. Rosalia Drake.  Leg swelling 05/2015    Dr. Bhargav Gerardo.  Lipoma     Left shoulder.  LVH (left ventricular hypertrophy) 5/2015    Dr. Inna Noe.  Screening for cervical cancer     Dr. Peña Ram, Last Pap 5/8/15    Sigmoid diverticulosis 01/2012    Mild. Dr. Nicko Ansari.  Snoring 07/16/15    Dr. Kamari Solitario. No ISAURO.  Sprain of finger, right 8/6/2013    [facility]         Past Surgical History:   Procedure Laterality Date    HX ACL RECONSTRUCTION Left 2009, 01/1011    due to Meniscal tear x 2 and ACL, MCL tear. Dr. Jluis Nolasco  01/09/12    Dr. Nicko Ansari.  HX CYST REMOVAL Left 02/2004, 2009    Left wrist.  Dr. Lauren Cervantes x2 then Dr. Rosalia Drake.  HX ENDOSCOPY  01/09/12    due to abd pain. Dr. Dann Montes.     HX KNEE ARTHROSCOPY Left 12/2014    with debridement of tibial cyst.  Dr. Kathrine Juarez  07/1999    Dr. Tara Chua Keshav    HX OPEN REDUCTION INTERNAL FIXATION Right 2012    PINS PLACED IN SECOND METATARSAL FRACTURED TOES. Dr. Justus Chowdhury.  HX ORTHOPAEDIC Right 12    REMOVAL OF HARDWARE FROM SECOND METATARSAL. Dr. Faviola Guadarrama. due to vaginal bleeding    HX TUBAL LIGATION      OH EXC SKIN BENIG 0.6-1CM TRUNK,ARM,LEG Left 97    Lipoma. Left shoulder. Dr. Claudia Skinner         Family History   Problem Relation Age of Onset    Hypertension Mother     No Known Problems Father     Stroke Paternal Grandfather         Social History     Socioeconomic History    Marital status:      Spouse name: Not on file    Number of children: Not on file    Years of education: Not on file    Highest education level: Not on file   Occupational History    Occupation:    Tobacco Use    Smoking status: Former Smoker     Packs/day: 0.25     Years: 15.00     Pack years: 3.75     Types: Cigarettes     Quit date: 1999     Years since quittin.9    Smokeless tobacco: Never Used    Tobacco comment: former cigarette smoker   Substance and Sexual Activity    Alcohol use: Yes     Alcohol/week: 0.0 standard drinks     Comment: Occ vodka/2 glasses wine per month    Drug use: No    Sexual activity: Yes     Partners: Male     Birth control/protection: Surgical     Comment: ABRAHAM, TL   Other Topics Concern    Not on file   Social History Narrative    Not on file     Social Determinants of Health     Financial Resource Strain:     Difficulty of Paying Living Expenses: Not on file   Food Insecurity:     Worried About Running Out of Food in the Last Year: Not on file    José Manuel of Food in the Last Year: Not on file   Transportation Needs:     Lack of Transportation (Medical): Not on file    Lack of Transportation (Non-Medical):  Not on file   Physical Activity:     Days of Exercise per Week: Not on file    Minutes of Exercise per Session: Not on file   Stress:     Feeling of Stress : Not on file   Social Connections:     Frequency of Communication with Friends and Family: Not on file    Frequency of Social Gatherings with Friends and Family: Not on file    Attends [employer] Services: Not on file    Active Member of Clubs or Organizations: Not on file    Attends Club or Organization Meetings: Not on file    Marital Status: Not on file   Intimate Partner Violence:     Fear of Current or Ex-Partner: Not on file    Emotionally Abused: Not on file    Physically Abused: Not on file    Sexually Abused: Not on file   Housing Stability:     Unable to Pay for Housing in the Last Year: Not on file    Number of [place] in the Last Year: Not on file    Unstable Housing in the Last Year: Not on file                ALLERGIES: Effexor [venlafaxine]    Review of Systems   Constitutional: Negative for activity change, appetite change, fever and unexpected weight change. Respiratory: Negative for cough and shortness of breath. Vitals:    06/07/22 0826   BP: 125/70   Pulse: 68   Resp: 16   Temp: 98.5 °F (36.9 °C)   SpO2: 99%   Weight: 155 lb 8 oz (70.5 kg)       Physical Exam  Vitals and nursing note reviewed. Constitutional:       General: She is not in acute distress. Appearance: She is well-developed. She is not diaphoretic. Cardiovascular:      Rate and Rhythm: Normal rate and regular rhythm. Heart sounds: Normal heart sounds. Pulmonary:      Effort: Pulmonary effort is normal. No respiratory distress. Breath sounds: Normal breath sounds. No stridor. No wheezing, rhonchi or rales. Neurological:      Mental Status: She is alert. Psychiatric:         Behavior: Behavior normal.         Thought Content: Thought content normal.         Judgment: Judgment normal.         MDM    ICD-10-CM ICD-9-CM   1.  Exposure to TB  Z20.1 V01.1       Orders Placed This Encounter    XR CHEST PA LAT     Standing Status:   Future     Number of Occurrences:   1     Standing Expiration Date:   7/8/2023     Order Specific Question:   Reason for Exam     Answer:   exposure; no sx      Chest x-ray normal    XR Results (most recent):  Results from Appointment encounter on 06/07/22    XR CHEST PA LAT    Narrative  EXAM:  XR CHEST PA LAT    INDICATION: Exposure to tuberculosis    COMPARISON: 12/28/2015    TECHNIQUE: PA and lateral chest views    FINDINGS: The cardiomediastinal and hilar contours are within normal limits. The  pulmonary vasculature is within normal limits. The lungs and pleural spaces are clear. There is no pneumothorax. The visualized  bones and upper abdomen are age-appropriate. Impression  No acute process. No evidence for active TB.       Procedures

## 2023-08-31 ENCOUNTER — HOSPITAL ENCOUNTER (OUTPATIENT)
Facility: HOSPITAL | Age: 63
Discharge: HOME | End: 2023-08-31
Payer: COMMERCIAL

## 2023-08-31 DIAGNOSIS — S99.921A INJURY OF TOE ON RIGHT FOOT, INITIAL ENCOUNTER: ICD-10-CM

## 2023-08-31 PROCEDURE — 73660 X-RAY EXAM OF TOE(S): CPT

## 2023-11-15 ENCOUNTER — HOSPITAL ENCOUNTER (OUTPATIENT)
Facility: HOSPITAL | Age: 63
Discharge: HOME | End: 2023-11-18
Payer: COMMERCIAL

## 2023-11-15 ENCOUNTER — ANESTHESIA EVENT (OUTPATIENT)
Facility: HOSPITAL | Age: 63
End: 2023-11-15
Payer: COMMERCIAL

## 2023-11-15 VITALS
TEMPERATURE: 98.6 F | HEIGHT: 66 IN | BODY MASS INDEX: 27.18 KG/M2 | RESPIRATION RATE: 18 BRPM | DIASTOLIC BLOOD PRESSURE: 82 MMHG | HEART RATE: 67 BPM | WEIGHT: 169.09 LBS | SYSTOLIC BLOOD PRESSURE: 151 MMHG

## 2023-11-15 LAB
EKG ATRIAL RATE: 59 BPM
EKG DIAGNOSIS: NORMAL
EKG P AXIS: 73 DEGREES
EKG P-R INTERVAL: 158 MS
EKG Q-T INTERVAL: 404 MS
EKG QRS DURATION: 66 MS
EKG QTC CALCULATION (BAZETT): 399 MS
EKG R AXIS: 13 DEGREES
EKG T AXIS: 26 DEGREES
EKG VENTRICULAR RATE: 59 BPM
HGB BLD-MCNC: 12.2 G/DL (ref 11.5–16)
POTASSIUM SERPL-SCNC: 4 MMOL/L (ref 3.5–5.1)

## 2023-11-15 PROCEDURE — 85018 HEMOGLOBIN: CPT

## 2023-11-15 PROCEDURE — 93005 ELECTROCARDIOGRAM TRACING: CPT | Performed by: ORTHOPAEDIC SURGERY

## 2023-11-15 PROCEDURE — 36415 COLL VENOUS BLD VENIPUNCTURE: CPT

## 2023-11-15 PROCEDURE — 84132 ASSAY OF SERUM POTASSIUM: CPT

## 2023-11-15 RX ORDER — MELATONIN 10 MG/ML
DROPS ORAL DAILY
COMMUNITY

## 2023-11-15 RX ORDER — FUROSEMIDE 20 MG/1
20 TABLET ORAL AS NEEDED
COMMUNITY

## 2023-11-15 RX ORDER — CHLORAL HYDRATE 500 MG
1 CAPSULE ORAL DAILY
COMMUNITY

## 2023-11-15 RX ORDER — GLUCOSAMINE/D3/BOSWELLIA SERRA 1500MG-400
1 TABLET ORAL DAILY
COMMUNITY

## 2023-11-15 RX ORDER — ALPRAZOLAM 0.25 MG/1
0.25 TABLET ORAL AS NEEDED
COMMUNITY

## 2023-11-15 ASSESSMENT — PAIN SCALES - GENERAL: PAINLEVEL_OUTOF10: 0

## 2023-11-15 NOTE — H&P
Subjective:   Patient ID: Samir Prieto is a 61 y.o. female. Pain Score: 0-No pain  Chief Complaint: Follow-up and Pain of the Left Knee     HISTORY OF PRESENT ILLNESS   Samir Prieto is a 61 y.o. female who presents today for pain of the left knee. She stepped off the curb while walking where she twisted her knee. She went to see Dr. Yoan Savage where he ordered an MRI which confirmed her having an ACL tear. She had a previous ACL reconstruction in 2016. She states having infrequent episodes of buckling. She is still able to go to the gym.       Medical History        Past Medical History:   Diagnosis Date    Asthma      no relevant past medical history           Surgical History         Past Surgical History:   Procedure Laterality Date    FOOT SURGERY        HAND SURGERY        KNEE SURGERY        NO RELEVANT SURGERIES                   Family History   Problem Relation Age of Onset    No Known Problems Mother      Diabetes Father      No Known Problems Brother      No Known Problems Sister      No Known Problems Son      No Known Problems Daughter      No Known Problems Other      Coronary artery disease Neg Hx      Clotting disorder Neg Hx      Anesthesia problems Neg Hx        Social History            Tobacco Use    Smoking status: Former       Types: Cigarettes    Smokeless tobacco: Former   Substance Use Topics    Alcohol use: Yes    Drug use: Not Currently      Review of Systems   10/25/2023     Constitutional: Unexplained: Negative  Genitourinary: Frequent Urination: Negative  HEENT: Vision Loss: Negative  Neurological: Memory Loss: Negative  Integumentary: Rash: Negative  Cardiovascular: Palpatations: Negative  Hematologic: Bruises/Bleeds Easily: Negative  Gastrointestinal: Constipation: Negative  Immunological: Seasonal Allergies: Positive  Musculoskeletal: Joint Pain: Positive  Objective:      Vitals       Vitals:     10/25/23 1453   BP: 126/80   Weight: 163 lb   Height: 5' 6\"

## 2023-11-15 NOTE — PERIOP NOTE
the immediate recovery period is completed, a nurse in the recovery area will contact your designated visitor to inform them of your room number or to otherwise review other pertinent information regarding your care. Social distancing practices are strongly encouraged in waiting areas and the cafeteria. The patient was contacted in person. She verbalized understanding of all instructions does not need reinforcement.

## 2023-11-16 ENCOUNTER — HOSPITAL ENCOUNTER (OUTPATIENT)
Facility: HOSPITAL | Age: 63
Setting detail: OUTPATIENT SURGERY
Discharge: HOME | End: 2023-11-16
Attending: ORTHOPAEDIC SURGERY | Admitting: ORTHOPAEDIC SURGERY
Payer: COMMERCIAL

## 2023-11-16 ENCOUNTER — ANESTHESIA (OUTPATIENT)
Facility: HOSPITAL | Age: 63
End: 2023-11-16
Payer: COMMERCIAL

## 2023-11-16 VITALS
TEMPERATURE: 97.1 F | BODY MASS INDEX: 27.16 KG/M2 | WEIGHT: 169 LBS | HEIGHT: 66 IN | DIASTOLIC BLOOD PRESSURE: 86 MMHG | SYSTOLIC BLOOD PRESSURE: 134 MMHG | HEART RATE: 64 BPM | OXYGEN SATURATION: 100 % | RESPIRATION RATE: 19 BRPM

## 2023-11-16 DIAGNOSIS — M25.562 ACUTE PAIN OF LEFT KNEE: Primary | ICD-10-CM

## 2023-11-16 PROCEDURE — 2580000003 HC RX 258: Performed by: NURSE ANESTHETIST, CERTIFIED REGISTERED

## 2023-11-16 PROCEDURE — 7100000001 HC PACU RECOVERY - ADDTL 15 MIN: Performed by: ORTHOPAEDIC SURGERY

## 2023-11-16 PROCEDURE — 3700000000 HC ANESTHESIA ATTENDED CARE: Performed by: ORTHOPAEDIC SURGERY

## 2023-11-16 PROCEDURE — 2709999900 HC NON-CHARGEABLE SUPPLY: Performed by: ORTHOPAEDIC SURGERY

## 2023-11-16 PROCEDURE — 3600000014 HC SURGERY LEVEL 4 ADDTL 15MIN: Performed by: ORTHOPAEDIC SURGERY

## 2023-11-16 PROCEDURE — C1713 ANCHOR/SCREW BN/BN,TIS/BN: HCPCS | Performed by: ORTHOPAEDIC SURGERY

## 2023-11-16 PROCEDURE — 7100000000 HC PACU RECOVERY - FIRST 15 MIN: Performed by: ORTHOPAEDIC SURGERY

## 2023-11-16 PROCEDURE — 6360000002 HC RX W HCPCS: Performed by: NURSE ANESTHETIST, CERTIFIED REGISTERED

## 2023-11-16 PROCEDURE — 3600000004 HC SURGERY LEVEL 4 BASE: Performed by: ORTHOPAEDIC SURGERY

## 2023-11-16 PROCEDURE — 6360000002 HC RX W HCPCS: Performed by: ORTHOPAEDIC SURGERY

## 2023-11-16 PROCEDURE — 2720000010 HC SURG SUPPLY STERILE: Performed by: ORTHOPAEDIC SURGERY

## 2023-11-16 PROCEDURE — 3700000001 HC ADD 15 MINUTES (ANESTHESIA): Performed by: ORTHOPAEDIC SURGERY

## 2023-11-16 PROCEDURE — 2500000003 HC RX 250 WO HCPCS: Performed by: NURSE ANESTHETIST, CERTIFIED REGISTERED

## 2023-11-16 PROCEDURE — C1762 CONN TISS, HUMAN(INC FASCIA): HCPCS | Performed by: ORTHOPAEDIC SURGERY

## 2023-11-16 PROCEDURE — 6370000000 HC RX 637 (ALT 250 FOR IP): Performed by: STUDENT IN AN ORGANIZED HEALTH CARE EDUCATION/TRAINING PROGRAM

## 2023-11-16 DEVICE — IMPLANTABLE DEVICE: Type: IMPLANTABLE DEVICE | Site: KNEE | Status: FUNCTIONAL

## 2023-11-16 DEVICE — GRAFT BONE 5CC HCT/P REGULATED VIABLE CELLULAR: Type: IMPLANTABLE DEVICE | Site: KNEE | Status: FUNCTIONAL

## 2023-11-16 RX ORDER — FENTANYL CITRATE 50 UG/ML
100 INJECTION, SOLUTION INTRAMUSCULAR; INTRAVENOUS
Status: DISCONTINUED | OUTPATIENT
Start: 2023-11-16 | End: 2023-11-16 | Stop reason: HOSPADM

## 2023-11-16 RX ORDER — SODIUM CHLORIDE 0.9 % (FLUSH) 0.9 %
5-40 SYRINGE (ML) INJECTION EVERY 12 HOURS SCHEDULED
Status: DISCONTINUED | OUTPATIENT
Start: 2023-11-16 | End: 2023-11-16 | Stop reason: HOSPADM

## 2023-11-16 RX ORDER — SODIUM CHLORIDE 9 MG/ML
INJECTION, SOLUTION INTRAVENOUS PRN
Status: DISCONTINUED | OUTPATIENT
Start: 2023-11-16 | End: 2023-11-16 | Stop reason: HOSPADM

## 2023-11-16 RX ORDER — SODIUM CHLORIDE 0.9 % (FLUSH) 0.9 %
5-40 SYRINGE (ML) INJECTION PRN
Status: DISCONTINUED | OUTPATIENT
Start: 2023-11-16 | End: 2023-11-16 | Stop reason: HOSPADM

## 2023-11-16 RX ORDER — FENTANYL CITRATE 50 UG/ML
INJECTION, SOLUTION INTRAMUSCULAR; INTRAVENOUS PRN
Status: DISCONTINUED | OUTPATIENT
Start: 2023-11-16 | End: 2023-11-16 | Stop reason: SDUPTHER

## 2023-11-16 RX ORDER — GLYCOPYRROLATE 0.2 MG/ML
INJECTION INTRAMUSCULAR; INTRAVENOUS PRN
Status: DISCONTINUED | OUTPATIENT
Start: 2023-11-16 | End: 2023-11-16 | Stop reason: SDUPTHER

## 2023-11-16 RX ORDER — ACETAMINOPHEN 500 MG
1000 TABLET ORAL ONCE
Status: DISCONTINUED | OUTPATIENT
Start: 2023-11-16 | End: 2023-11-16 | Stop reason: HOSPADM

## 2023-11-16 RX ORDER — LABETALOL HYDROCHLORIDE 5 MG/ML
INJECTION, SOLUTION INTRAVENOUS PRN
Status: DISCONTINUED | OUTPATIENT
Start: 2023-11-16 | End: 2023-11-16 | Stop reason: SDUPTHER

## 2023-11-16 RX ORDER — LIDOCAINE HYDROCHLORIDE 20 MG/ML
INJECTION, SOLUTION EPIDURAL; INFILTRATION; INTRACAUDAL; PERINEURAL PRN
Status: DISCONTINUED | OUTPATIENT
Start: 2023-11-16 | End: 2023-11-16 | Stop reason: SDUPTHER

## 2023-11-16 RX ORDER — ROCURONIUM BROMIDE 10 MG/ML
INJECTION, SOLUTION INTRAVENOUS PRN
Status: DISCONTINUED | OUTPATIENT
Start: 2023-11-16 | End: 2023-11-16 | Stop reason: SDUPTHER

## 2023-11-16 RX ORDER — LIDOCAINE HYDROCHLORIDE 10 MG/ML
1 INJECTION, SOLUTION INFILTRATION; PERINEURAL
Status: DISCONTINUED | OUTPATIENT
Start: 2023-11-16 | End: 2023-11-16 | Stop reason: HOSPADM

## 2023-11-16 RX ORDER — PROCHLORPERAZINE EDISYLATE 5 MG/ML
5 INJECTION INTRAMUSCULAR; INTRAVENOUS
Status: DISCONTINUED | OUTPATIENT
Start: 2023-11-16 | End: 2023-11-16 | Stop reason: HOSPADM

## 2023-11-16 RX ORDER — HYDROMORPHONE HYDROCHLORIDE 1 MG/ML
0.5 INJECTION, SOLUTION INTRAMUSCULAR; INTRAVENOUS; SUBCUTANEOUS EVERY 5 MIN PRN
Status: DISCONTINUED | OUTPATIENT
Start: 2023-11-16 | End: 2023-11-16 | Stop reason: HOSPADM

## 2023-11-16 RX ORDER — ONDANSETRON 2 MG/ML
4 INJECTION INTRAMUSCULAR; INTRAVENOUS
Status: DISCONTINUED | OUTPATIENT
Start: 2023-11-16 | End: 2023-11-16 | Stop reason: HOSPADM

## 2023-11-16 RX ORDER — SODIUM CHLORIDE, SODIUM LACTATE, POTASSIUM CHLORIDE, CALCIUM CHLORIDE 600; 310; 30; 20 MG/100ML; MG/100ML; MG/100ML; MG/100ML
INJECTION, SOLUTION INTRAVENOUS CONTINUOUS PRN
Status: DISCONTINUED | OUTPATIENT
Start: 2023-11-16 | End: 2023-11-16 | Stop reason: SDUPTHER

## 2023-11-16 RX ORDER — SUCCINYLCHOLINE CHLORIDE 20 MG/ML
INJECTION INTRAMUSCULAR; INTRAVENOUS PRN
Status: DISCONTINUED | OUTPATIENT
Start: 2023-11-16 | End: 2023-11-16 | Stop reason: SDUPTHER

## 2023-11-16 RX ORDER — OXYCODONE HYDROCHLORIDE 5 MG/1
5 TABLET ORAL EVERY 6 HOURS PRN
Qty: 41 TABLET | Refills: 0 | Status: SHIPPED | OUTPATIENT
Start: 2023-11-16 | End: 2023-11-23

## 2023-11-16 RX ORDER — NEOSTIGMINE METHYLSULFATE 1 MG/ML
INJECTION, SOLUTION INTRAVENOUS PRN
Status: DISCONTINUED | OUTPATIENT
Start: 2023-11-16 | End: 2023-11-16 | Stop reason: SDUPTHER

## 2023-11-16 RX ORDER — MIDAZOLAM HYDROCHLORIDE 2 MG/2ML
2 INJECTION, SOLUTION INTRAMUSCULAR; INTRAVENOUS
Status: DISCONTINUED | OUTPATIENT
Start: 2023-11-16 | End: 2023-11-16 | Stop reason: HOSPADM

## 2023-11-16 RX ORDER — HYDROMORPHONE HYDROCHLORIDE 2 MG/ML
INJECTION, SOLUTION INTRAMUSCULAR; INTRAVENOUS; SUBCUTANEOUS PRN
Status: DISCONTINUED | OUTPATIENT
Start: 2023-11-16 | End: 2023-11-16 | Stop reason: SDUPTHER

## 2023-11-16 RX ORDER — MIDAZOLAM HYDROCHLORIDE 1 MG/ML
INJECTION INTRAMUSCULAR; INTRAVENOUS PRN
Status: DISCONTINUED | OUTPATIENT
Start: 2023-11-16 | End: 2023-11-16 | Stop reason: SDUPTHER

## 2023-11-16 RX ORDER — ROPIVACAINE HYDROCHLORIDE 5 MG/ML
INJECTION, SOLUTION EPIDURAL; INFILTRATION; PERINEURAL PRN
Status: DISCONTINUED | OUTPATIENT
Start: 2023-11-16 | End: 2023-11-16 | Stop reason: HOSPADM

## 2023-11-16 RX ORDER — HYDRALAZINE HYDROCHLORIDE 20 MG/ML
10 INJECTION INTRAMUSCULAR; INTRAVENOUS
Status: DISCONTINUED | OUTPATIENT
Start: 2023-11-16 | End: 2023-11-16 | Stop reason: HOSPADM

## 2023-11-16 RX ORDER — DEXAMETHASONE SODIUM PHOSPHATE 4 MG/ML
INJECTION, SOLUTION INTRA-ARTICULAR; INTRALESIONAL; INTRAMUSCULAR; INTRAVENOUS; SOFT TISSUE PRN
Status: DISCONTINUED | OUTPATIENT
Start: 2023-11-16 | End: 2023-11-16 | Stop reason: SDUPTHER

## 2023-11-16 RX ORDER — OXYCODONE HYDROCHLORIDE 5 MG/1
5 TABLET ORAL
Status: COMPLETED | OUTPATIENT
Start: 2023-11-16 | End: 2023-11-16

## 2023-11-16 RX ORDER — CEFAZOLIN SODIUM 1 G/3ML
INJECTION, POWDER, FOR SOLUTION INTRAMUSCULAR; INTRAVENOUS PRN
Status: DISCONTINUED | OUTPATIENT
Start: 2023-11-16 | End: 2023-11-16 | Stop reason: SDUPTHER

## 2023-11-16 RX ORDER — FENTANYL CITRATE 50 UG/ML
25 INJECTION, SOLUTION INTRAMUSCULAR; INTRAVENOUS EVERY 5 MIN PRN
Status: DISCONTINUED | OUTPATIENT
Start: 2023-11-16 | End: 2023-11-16 | Stop reason: HOSPADM

## 2023-11-16 RX ORDER — SODIUM CHLORIDE, SODIUM LACTATE, POTASSIUM CHLORIDE, CALCIUM CHLORIDE 600; 310; 30; 20 MG/100ML; MG/100ML; MG/100ML; MG/100ML
INJECTION, SOLUTION INTRAVENOUS CONTINUOUS
Status: DISCONTINUED | OUTPATIENT
Start: 2023-11-16 | End: 2023-11-16 | Stop reason: HOSPADM

## 2023-11-16 RX ORDER — ONDANSETRON 2 MG/ML
INJECTION INTRAMUSCULAR; INTRAVENOUS PRN
Status: DISCONTINUED | OUTPATIENT
Start: 2023-11-16 | End: 2023-11-16 | Stop reason: SDUPTHER

## 2023-11-16 RX ADMIN — LABETALOL HYDROCHLORIDE 5 MG: 5 INJECTION, SOLUTION INTRAVENOUS at 15:36

## 2023-11-16 RX ADMIN — HYDROMORPHONE HYDROCHLORIDE 0.6 MG: 2 INJECTION, SOLUTION INTRAMUSCULAR; INTRAVENOUS; SUBCUTANEOUS at 14:20

## 2023-11-16 RX ADMIN — ROCURONIUM BROMIDE 20 MG: 10 INJECTION, SOLUTION INTRAVENOUS at 13:42

## 2023-11-16 RX ADMIN — NEOSTIGMINE METHYLSULFATE 0.4 MG: 1 INJECTION, SOLUTION INTRAVENOUS at 14:56

## 2023-11-16 RX ADMIN — SODIUM CHLORIDE, SODIUM LACTATE, POTASSIUM CHLORIDE, CALCIUM CHLORIDE: 600; 310; 30; 20 INJECTION, SOLUTION INTRAVENOUS at 12:28

## 2023-11-16 RX ADMIN — HYDROMORPHONE HYDROCHLORIDE 0.6 MG: 2 INJECTION, SOLUTION INTRAMUSCULAR; INTRAVENOUS; SUBCUTANEOUS at 15:56

## 2023-11-16 RX ADMIN — HYDROMORPHONE HYDROCHLORIDE 0.4 MG: 2 INJECTION, SOLUTION INTRAMUSCULAR; INTRAVENOUS; SUBCUTANEOUS at 15:22

## 2023-11-16 RX ADMIN — GLYCOPYRROLATE 0.4 MG: 0.2 INJECTION INTRAMUSCULAR; INTRAVENOUS at 15:44

## 2023-11-16 RX ADMIN — HYDROMORPHONE HYDROCHLORIDE 0.4 MG: 2 INJECTION, SOLUTION INTRAMUSCULAR; INTRAVENOUS; SUBCUTANEOUS at 15:04

## 2023-11-16 RX ADMIN — FENTANYL CITRATE 50 MCG: 50 INJECTION, SOLUTION INTRAMUSCULAR; INTRAVENOUS at 13:27

## 2023-11-16 RX ADMIN — NEOSTIGMINE METHYLSULFATE 0.4 MG: 1 INJECTION, SOLUTION INTRAVENOUS at 15:44

## 2023-11-16 RX ADMIN — ROCURONIUM BROMIDE 5 MG: 10 INJECTION, SOLUTION INTRAVENOUS at 13:27

## 2023-11-16 RX ADMIN — SODIUM CHLORIDE, SODIUM LACTATE, POTASSIUM CHLORIDE, CALCIUM CHLORIDE: 600; 310; 30; 20 INJECTION, SOLUTION INTRAVENOUS at 13:34

## 2023-11-16 RX ADMIN — SUCCINYLCHOLINE CHLORIDE 120 MG: 20 INJECTION INTRAMUSCULAR; INTRAVENOUS at 13:27

## 2023-11-16 RX ADMIN — MIDAZOLAM HYDROCHLORIDE 2 MG: 1 INJECTION INTRAMUSCULAR; INTRAVENOUS at 13:22

## 2023-11-16 RX ADMIN — LABETALOL HYDROCHLORIDE 5 MG: 5 INJECTION, SOLUTION INTRAVENOUS at 16:06

## 2023-11-16 RX ADMIN — ONDANSETRON 4 MG: 2 INJECTION INTRAMUSCULAR; INTRAVENOUS at 13:59

## 2023-11-16 RX ADMIN — LIDOCAINE HYDROCHLORIDE 60 MG: 20 INJECTION, SOLUTION EPIDURAL; INFILTRATION; INTRACAUDAL; PERINEURAL at 13:27

## 2023-11-16 RX ADMIN — DEXAMETHASONE SODIUM PHOSPHATE 8 MG: 4 INJECTION, SOLUTION INTRA-ARTICULAR; INTRALESIONAL; INTRAMUSCULAR; INTRAVENOUS; SOFT TISSUE at 13:52

## 2023-11-16 RX ADMIN — CEFAZOLIN SODIUM 2 G: 1 INJECTION, POWDER, FOR SOLUTION INTRAMUSCULAR; INTRAVENOUS at 13:41

## 2023-11-16 RX ADMIN — OXYCODONE 5 MG: 5 TABLET ORAL at 17:09

## 2023-11-16 RX ADMIN — FENTANYL CITRATE 50 MCG: 50 INJECTION, SOLUTION INTRAMUSCULAR; INTRAVENOUS at 13:35

## 2023-11-16 ASSESSMENT — PAIN DESCRIPTION - LOCATION
LOCATION: KNEE
LOCATION: KNEE

## 2023-11-16 ASSESSMENT — PAIN SCALES - GENERAL
PAINLEVEL_OUTOF10: 8
PAINLEVEL_OUTOF10: 4
PAINLEVEL_OUTOF10: 0

## 2023-11-16 ASSESSMENT — PAIN DESCRIPTION - ORIENTATION
ORIENTATION: LEFT
ORIENTATION: LEFT

## 2023-11-16 NOTE — DISCHARGE INSTRUCTIONS
POST OPERATIVE INSTRUCTIONS  Knee Arthroscopy   Elaina DARIAOpal Yanet Sensor, [address] [facility]          First meal at home should be clear liquids. Progress to regular diet as tolerated. Apply an ice bag or use cold therapy device continuously for the first 2-3 days and then apply several times a day for the next 10 days to reduce pain and swelling               After that you can ice as desired. You may remove the bulky dressing  48  hours after surgery and at that time it is ok to shower. You can get the incisions a little wet, but please do not submerge in a tub or pool. Apply band aids over the small incisions and change daily. Elevate your surgical leg when sitting or sleeping to reduce swelling. Do not place a pillow directly under the knee, place it under your ankle. It is important to work on getting your knee out all the way straight. Exercises- (see Below) Please practice quadricep tightening, straight leg raising exercises and ankle pumps several times a day. Gradually work on bending your knee as tolerated. See below       Crutches will be provided if you do not already have them. Please use crutches to help you weight bear for the first few days. After than you can gradually wean off the crutches as you tolerate. If you are limping a lot, we recommend that you stay with at least one crutch until your gait is improved. Medication Instructions : You have been given oxycodone 5mg tablets. You may take 1 tablet every 4 hours as needed for pain. It is ok to supplement with Tylenol and Ibuprofen or Aleve                PLEASE TAKE 1 ASPIRIN (81mg ) TWICE A DAY FOR 10 DAYS            ALL NARCOTICS CAN CAUSE CONSTIPATION, SO WE RECOMMEND THAT YOU DRINK PLENTY OF WATER AND TAKE AN OTC STOOL SOFTENER              SUCH AS MIRALAX '          8.   A post- op appointment has already been made for you on 11/28/2023 @ 8:30am with Melissa Guzmán back has a normal curve. You should be able to slip your flat hand in between the floor and the small of your back, with your palm touching the floor and your back touching the back of your hand. Tighten the thigh muscles in the injured leg by pressing the back of your knee flat down to the floor. Hold your knee straight. Keeping the thigh muscles tight, lift your injured leg up so that your heel is about 12 inches off the floor. Hold for 5 seconds and then lower slowly. Do 8 to 12 repetitions. Advance to doing this exercise from a sitting position. Heel Slides :    Lie down on the floor or the bed with your leg flat. Slowly begin to slide your heel toward your rear end (buttocks), keeping your heel on the floor. Your knee will begin to bend. Slide your heel and bend your knee until it becomes a little sore and you can feel a small amount of pressure inside your knee. Hold this position for 10 seconds. Slide your heel back down until your leg is straight on the floor. Relax for 10 seconds. Repeat this exercise 20 times.

## 2023-11-30 NOTE — OP NOTE
Operative Report      Patient: Mallory Francis MRN: [id number]  SSN: [id number]    YOB: 1960  Age: 61 y.o. Sex: female      DATE OF SURGERY:  11/16/2023    Date of Surgery: [unfilled]     Preoperative Diagnosis:    1. Acute medial meniscus tear of left knee  2. Rupture of anterior cruciate ligament of left knee  3. Tear of anterior cruciate ligament graft  4. Bone cyst of left femur   5. Bone cyst of left tibia     Postoperative Diagnosis:  1. Acute medial meniscus tear of left knee  2. Rupture of anterior cruciate ligament of left knee  3. Tear of anterior cruciate ligament graft  4. Bone cyst of left femur   5. Bone cyst of left tibia   6. DJD Medial Compartment Right Knee    Procedures: 1. LEFT KNEE ARTHROSCOPY WITH PARTIAL  MENISCECTOMY  2. OPEN GRAFT OF TIBIAL AND FEMORAL TUNNELS    Surgeon(s) and Role:     Elaina Coello MD (Jody) - Primary     Anesthesia: General    Estimated Blood Loss:         Pathology: Cystic degeneration of ACL Tunnels Left Knee, Medial Meniscus Tear, DJD Knee    Specimens: None    Complications: None         Indications: The patient is a 61 y.o. female with a known history of Recurrent ACL Tear with medial meniscus tear in the left knee as well as cystic degeneration of her tibial tunnels. Preoperative physical examination, radiographs, and magnetic resonance imaging demonstrated Medial Meniscus Tear in Her left knee. She is now electively admitted for knee arthroscopy and partial meniscectomy followed by open tunnel grafting. Procedure in Detail: After the procedure was explained to the patient, including the risks, benefits and possible complications, the patient signed the informed consent. Following identification, the patient was taken to the operating suite. Following induction of general anesthesia and administration of antibiotic, the patient was positioned on the operating table in the supine fashion. The left knee was then examined under anesthesia.  The

## 2025-02-17 ENCOUNTER — HOSPITAL ENCOUNTER (OUTPATIENT)
Facility: HOSPITAL | Age: 65
Discharge: HOME | End: 2025-02-20
Attending: FAMILY MEDICINE
Payer: COMMERCIAL

## 2025-02-17 ENCOUNTER — TRANSCRIBE ORDERS (OUTPATIENT)
Facility: HOSPITAL | Age: 65
End: 2025-02-17

## 2025-02-17 DIAGNOSIS — Z92.89 HISTORY OF POSITIVE PPD: ICD-10-CM

## 2025-02-17 DIAGNOSIS — Z92.89 HISTORY OF POSITIVE PPD: Primary | ICD-10-CM

## 2025-02-17 PROCEDURE — 71046 X-RAY EXAM CHEST 2 VIEWS: CPT

## 2025-03-04 ENCOUNTER — TRANSCRIBE ORDERS (OUTPATIENT)
Facility: HOSPITAL | Age: 65
End: 2025-03-04

## 2025-03-04 DIAGNOSIS — R51.9 FACIAL PAIN: Primary | ICD-10-CM

## (undated) DEVICE — DISPOSABLE TOURNIQUET CUFF SINGLE BLADDER, DUAL PORT AND QUICK CONNECT CONNECTOR: Brand: COLOR CUFF

## (undated) DEVICE — APPLICATOR MEDICATED 26 CC SOLUTION HI LT ORNG CHLORAPREP

## (undated) DEVICE — GARMENT,MEDLINE,DVT,INT,CALF,MED, GEN2: Brand: MEDLINE

## (undated) DEVICE — Device

## (undated) DEVICE — CUSTOM CAST PD STR

## (undated) DEVICE — KIT INSTR TRANSTIBIAL ACL W/ SAW BLDE DISP

## (undated) DEVICE — PADDING UNDERCAST W4INXL12FT RAYON POLY SYN NONADHESIVE

## (undated) DEVICE — GLOVE ORTHO 8   MSG9480

## (undated) DEVICE — 4-PORT MANIFOLD: Brand: NEPTUNE 2

## (undated) DEVICE — SOLUTION IRRIG 3000ML LAC RINGERS ARTHROMTC PLAS CONT

## (undated) DEVICE — SUTURE ETHLN SZ 4-0 L18IN NONABSORBABLE BLK L16MM PC-3 3/8 1864G

## (undated) DEVICE — 4.5 MM INCISOR PLUS STRAIGHT                                    BLADES, POWER/EP-1, VIOLET, PACKAGED                                    6 PER BOX, STERILE

## (undated) DEVICE — DYONICS 25 INFLOW/OUTFLOW TUBE                                    SET, SINGLE SUCTION, 3 PER BOX

## (undated) DEVICE — ARTHROSCOPY - RICHMOND: Brand: MEDLINE INDUSTRIES, INC.

## (undated) DEVICE — MARKER,SKIN,WI/RULER AND LABELS: Brand: MEDLINE

## (undated) DEVICE — INTENT OT USE PROVIDES A STERILE INTERFACE BETWEEN THE OPERATING ROOM SURGICAL LAMPS (NON-STERILE) AND THE SURGEON OR STAFF WORKING IN THE STERILE FIELD.: Brand: ASPEN® ALC PLUS LIGHT HANDLE COVER

## (undated) DEVICE — GOWN SURG XL 56.5 IN AAMI LEVEL 3 ORBIS

## (undated) DEVICE — GLOVE SURG SZ 65 THK91MIL LTX FREE SYN POLYISOPRENE

## (undated) DEVICE — SPONGE GZ W4XL4IN COT 12 PLY TYP VII WVN C FLD DSGN STERILE

## (undated) DEVICE — KIT BNE CEM ANK SHLDR EL APPLICATIONS W/ SM APPL MIX AND